# Patient Record
Sex: MALE | Race: WHITE | NOT HISPANIC OR LATINO | Employment: UNEMPLOYED | URBAN - METROPOLITAN AREA
[De-identification: names, ages, dates, MRNs, and addresses within clinical notes are randomized per-mention and may not be internally consistent; named-entity substitution may affect disease eponyms.]

---

## 2020-07-01 ENCOUNTER — HOSPITAL ENCOUNTER (EMERGENCY)
Facility: HOSPITAL | Age: 35
Discharge: HOME | End: 2020-07-01
Attending: EMERGENCY MEDICINE

## 2020-07-01 VITALS
HEART RATE: 84 BPM | RESPIRATION RATE: 16 BRPM | SYSTOLIC BLOOD PRESSURE: 159 MMHG | WEIGHT: 180 LBS | OXYGEN SATURATION: 97 % | TEMPERATURE: 98 F | DIASTOLIC BLOOD PRESSURE: 97 MMHG | BODY MASS INDEX: 24.38 KG/M2 | HEIGHT: 72 IN

## 2020-07-01 DIAGNOSIS — F10.20 ALCOHOLISM: Primary | ICD-10-CM

## 2020-07-01 DIAGNOSIS — R79.89 ELEVATED LFTS: ICD-10-CM

## 2020-07-01 DIAGNOSIS — D69.6 THROMBOCYTOPENIA: ICD-10-CM

## 2020-07-01 LAB
ALBUMIN SERPL BCP-MCNC: 4.1 G/DL (ref 3.5–5.2)
ALP SERPL-CCNC: 167 U/L (ref 55–135)
ALT SERPL W/O P-5'-P-CCNC: 232 U/L (ref 10–44)
ANION GAP SERPL CALC-SCNC: 13 MMOL/L (ref 8–16)
AST SERPL-CCNC: 573 U/L (ref 10–40)
BASOPHILS # BLD AUTO: 0.05 K/UL (ref 0–0.2)
BASOPHILS NFR BLD: 1.2 % (ref 0–1.9)
BILIRUB SERPL-MCNC: 1.3 MG/DL (ref 0.1–1)
BUN SERPL-MCNC: 6 MG/DL (ref 6–20)
CALCIUM SERPL-MCNC: 9.2 MG/DL (ref 8.7–10.5)
CHLORIDE SERPL-SCNC: 104 MMOL/L (ref 95–110)
CO2 SERPL-SCNC: 25 MMOL/L (ref 23–29)
CREAT SERPL-MCNC: 0.8 MG/DL (ref 0.5–1.4)
DIFFERENTIAL METHOD: ABNORMAL
EOSINOPHIL # BLD AUTO: 0 K/UL (ref 0–0.5)
EOSINOPHIL NFR BLD: 0.2 % (ref 0–8)
ERYTHROCYTE [DISTWIDTH] IN BLOOD BY AUTOMATED COUNT: 12.8 % (ref 11.5–14.5)
EST. GFR  (AFRICAN AMERICAN): >60 ML/MIN/1.73 M^2
EST. GFR  (NON AFRICAN AMERICAN): >60 ML/MIN/1.73 M^2
GLUCOSE SERPL-MCNC: 156 MG/DL (ref 70–110)
HCT VFR BLD AUTO: 45 % (ref 40–54)
HGB BLD-MCNC: 14.5 G/DL (ref 14–18)
IMM GRANULOCYTES # BLD AUTO: 0.01 K/UL (ref 0–0.04)
IMM GRANULOCYTES NFR BLD AUTO: 0.2 % (ref 0–0.5)
LIPASE SERPL-CCNC: 58 U/L (ref 4–60)
LYMPHOCYTES # BLD AUTO: 0.6 K/UL (ref 1–4.8)
LYMPHOCYTES NFR BLD: 15.8 % (ref 18–48)
MCH RBC QN AUTO: 32.3 PG (ref 27–31)
MCHC RBC AUTO-ENTMCNC: 32.2 G/DL (ref 32–36)
MCV RBC AUTO: 100 FL (ref 82–98)
MONOCYTES # BLD AUTO: 0.4 K/UL (ref 0.3–1)
MONOCYTES NFR BLD: 10.4 % (ref 4–15)
NEUTROPHILS # BLD AUTO: 2.9 K/UL (ref 1.8–7.7)
NEUTROPHILS NFR BLD: 72.2 % (ref 38–73)
NRBC BLD-RTO: 0 /100 WBC
PLATELET # BLD AUTO: 94 K/UL (ref 150–350)
PMV BLD AUTO: 11.3 FL (ref 9.2–12.9)
POTASSIUM SERPL-SCNC: 3.6 MMOL/L (ref 3.5–5.1)
PROT SERPL-MCNC: 9 G/DL (ref 6–8.4)
RBC # BLD AUTO: 4.49 M/UL (ref 4.6–6.2)
SODIUM SERPL-SCNC: 142 MMOL/L (ref 136–145)
WBC # BLD AUTO: 4.05 K/UL (ref 3.9–12.7)

## 2020-07-01 PROCEDURE — 85025 COMPLETE CBC W/AUTO DIFF WBC: CPT

## 2020-07-01 PROCEDURE — 99283 EMERGENCY DEPT VISIT LOW MDM: CPT

## 2020-07-01 PROCEDURE — 99285 EMERGENCY DEPT VISIT HI MDM: CPT | Mod: ,,, | Performed by: EMERGENCY MEDICINE

## 2020-07-01 PROCEDURE — 80053 COMPREHEN METABOLIC PANEL: CPT

## 2020-07-01 PROCEDURE — 99285 PR EMERGENCY DEPT VISIT,LEVEL V: ICD-10-PCS | Mod: ,,, | Performed by: EMERGENCY MEDICINE

## 2020-07-01 PROCEDURE — 83690 ASSAY OF LIPASE: CPT

## 2020-07-01 RX ORDER — CHLORDIAZEPOXIDE HYDROCHLORIDE 25 MG/1
CAPSULE, GELATIN COATED ORAL
Qty: 15 CAPSULE | Refills: 0 | Status: SHIPPED | OUTPATIENT
Start: 2020-07-01

## 2020-07-01 NOTE — ED NOTES
"Patient identifiers verified and correct for Mr Fletcher  C/C: Abnormal labs SEE NN  APPEARANCE: awake and alert in NAD.  SKIN: warm, dry and intact. No breakdown or bruising.  MUSCULOSKELETAL: Patient moving all extremities spontaneously, no obvious swelling or deformities noted. Ambulates independently.  RESPIRATORY: Denies shortness of breath.Respirations unlabored.   CARDIAC: Denies CP, 2+ distal pulses; no peripheral edema  ABDOMEN: Abdomen soft, denies nausea or vomiting  : voids spontaneously, denies difficulty, states urine dark and "frothy"   Neurologic: AAO x 4; follows commands equal strength in all extremities; denies numbness/tingling. Denies dizziness Denies weakness, states intermittent numbness to marisol feet,     "

## 2020-07-01 NOTE — ED TRIAGE NOTES
Patient states he had labs drawn Monday for possible clinical trial participation, was called today to come to ED for abnormal labs. Last ETOH today.

## 2020-07-01 NOTE — DISCHARGE INSTRUCTIONS
Stop drinking alcohol. Follow up with AA ASAP. Take librium as prescribed to help with detox process.

## 2020-07-01 NOTE — ED PROVIDER NOTES
Encounter Date: 7/1/2020    SCRIBE #1 NOTE: I, Farrahalberto Loomis, am scribing for, and in the presence of,  WiddoJosue dela cruz. I have scribed the following portions of the note - Other sections scribed: HPI, ROS, PE.       History     Chief Complaint   Patient presents with    Abnormal Lab     , , BUN 7, Cr 0.65, from clinical trial MD     The history is provided by the patient and medical records. No  was used.      Mr. Fletcher is a 35 y.o. male with hypertension, psoriasis and a history of alcoholism here today after receiving abnormal lab results. The patient states that he had blood drawn when enrolling in a clinical trial for psoriasis and was referred to the ED based on abnormal lab results. The results showed , , BUN 7, creatinine 0.65. He received similar lab results in January. The patient reports he drinks every day, about 1 pint/day of gin. His last drink was today. He reports numbness/tingling in his feet for the past approximately 2 months. He denies fevers, chills, n/v, abdominal pain, chest pain, SOB, nausea, and vomiting.         Review of patient's allergies indicates:  No Known Allergies  Past Medical History:   Diagnosis Date    Alcoholism in member of household     Hypertension      History reviewed. No pertinent surgical history.  History reviewed. No pertinent family history.  Social History     Tobacco Use    Smoking status: Never Smoker    Smokeless tobacco: Never Used   Substance Use Topics    Alcohol use: Yes     Comment: daily ETOH, today 1 alcoholic drink    Drug use: Yes     Types: Marijuana     Comment: occas     Review of Systems   Constitutional: Negative for fever.   HENT: Negative for sore throat.    Respiratory: Negative for shortness of breath.    Cardiovascular: Negative for chest pain.   Gastrointestinal: Negative for abdominal pain, nausea and vomiting.   Genitourinary: Negative for dysuria.   Musculoskeletal: Negative for  back pain.   Skin: Negative for rash.   Neurological: Positive for numbness (Numbness/tingling in feet). Negative for weakness.   Hematological: Does not bruise/bleed easily.       Physical Exam     Initial Vitals [07/01/20 1528]   BP Pulse Resp Temp SpO2   (!) 152/102 102 14 97.9 °F (36.6 °C) 97 %      MAP       --         Physical Exam    Nursing note and vitals reviewed.  Constitutional: He appears well-developed and well-nourished. He is not diaphoretic. No distress.   HENT:   Head: Normocephalic and atraumatic.   Right Ear: External ear normal.   Left Ear: External ear normal.   Eyes: No scleral icterus.   Neck: Neck supple.   Cardiovascular: Normal rate, regular rhythm, normal heart sounds and intact distal pulses.   Pulmonary/Chest: Breath sounds normal. No respiratory distress. He has no wheezes. He has no rhonchi. He has no rales.   Abdominal: Soft. He exhibits no distension. There is no abdominal tenderness. There is no rebound and no guarding.   Neurological: He is alert and oriented to person, place, and time. He has normal strength. No sensory deficit. GCS score is 15. GCS eye subscore is 4. GCS verbal subscore is 5. GCS motor subscore is 6.   Skin: Skin is warm. Capillary refill takes less than 2 seconds. No rash noted.   Multiple tattoos present.  Psoriatic plaques on legs and arms.   Psychiatric: He has a normal mood and affect.         ED Course   Procedures  Labs Reviewed   COMPREHENSIVE METABOLIC PANEL - Abnormal; Notable for the following components:       Result Value    Glucose 156 (*)     Total Protein 9.0 (*)     Total Bilirubin 1.3 (*)     Alkaline Phosphatase 167 (*)      (*)      (*)     All other components within normal limits   CBC W/ AUTO DIFFERENTIAL - Abnormal; Notable for the following components:    RBC 4.49 (*)     Mean Corpuscular Volume 100 (*)     Mean Corpuscular Hemoglobin 32.3 (*)     Platelets 94 (*)     Lymph # 0.6 (*)     Lymph% 15.8 (*)     All other  components within normal limits   LIPASE          Imaging Results    None          Medical Decision Making:   History:   Old Medical Records: I decided to obtain old medical records.  Old Records Summarized: records from another hospital.       <> Summary of Records: Seen at [facility] in January and before for EtOH withdrawals. Had elevated LFTs at that time, similar to elevations here. Had negative serologies for HIV and Hepatitis then. Was advised to f/u w/ GI but has not done so.  Clinical Tests:   Lab Tests: Ordered and Reviewed  ED Management:  Vitals normal. Afebrile.  Here today with abnormal liver function test on pre trial screening labs.  Outside hospital records obtained showing the patient has had elevated LFTs in the past.  I suspect this is likely due to chronic alcohol abuse.  He drinks a pt daily at least.  Had a daiquiri earlier today.  No signs of intoxication or withdrawal at this time.  Patient is eager to consuming alcohol as he is aware that is harming his liver.  Discussed with social work to talk with patient and provided resources for detox and AA.  Will repeat labs here to ensure they are not worsening.  CBC, CMP obtained.    Labs with mild thrombocytopenia, elevated LFTs.  T bili is 1.3 but this is not significantly elevated compared to prior.  I do not believe this requires emergent workup or admission at this time.    Discussed with patient the need for alcohol cessation to ensure the best liver recovery as possible.  Also referred to hepatology Clinic and strongly advised patient to the follow-up with such.  He reports that he wants help and is going to follow up as directed.    Will provide prescription for Librium taper this patient is actively wanting to stop drinking.  Advised to f/u w/ PCP as well as tingling may be due to B12 deficiency given alcoholism.    Stable for discharge at this time. Return precautions discussed.   Other:   I have discussed this case with another health care  provider.       <> Summary of the Discussion: ED .            Scribe Attestation:   Scribe #1: I performed the above scribed service and the documentation accurately describes the services I performed. I attest to the accuracy of the note.                          Clinical Impression:       ICD-10-CM ICD-9-CM   1. Alcoholism  F10.20 303.90   2. Elevated LFTs  R94.5 790.6   3. Thrombocytopenia  D69.6 287.5             ED Disposition Condition    Discharge Stable        ED Prescriptions     Medication Sig Dispense Start Date End Date Auth. Provider    chlordiazepoxide (LIBRIUM) 25 MG Cap Day 1:  Take 50 mg (2 tabs) PO q6hr  Day 2:  Take 25 mg (1 tab) PO q6hr  Day 3:  Take 25 mg (1 tab) PO q12hr  Day 4:  Take 25 mg (1 tab) PO qHS 15 capsule 7/1/2020  Josue Murguia MD        Follow-up Information     Follow up With Specialties Details Why Contact Info Additional Information    [facility] Emergency Medicine  If symptoms worsen [address] [id number] [phone]     [facility] - Hepatology Hepatology In 1 week  [address] [id number] [phone] 1st Floor - [facility], located by [facility]    Primary care physician  In 1 week                                        Josue Murguia MD  07/02/20 2234

## 2020-07-02 ENCOUNTER — DOCUMENTATION ONLY (OUTPATIENT)
Dept: TRANSPLANT | Facility: CLINIC | Age: 35
End: 2020-07-02

## 2020-07-02 ENCOUNTER — TELEPHONE (OUTPATIENT)
Dept: HEPATOLOGY | Facility: CLINIC | Age: 35
End: 2020-07-02

## 2020-07-02 NOTE — NURSING
Pt records reviewed.   Pt will be referred to Hepatology.  Elevated LFTs  Initial referral received  from the [facility]que.   Referring Provider/diagnosis  Josue Murguia MD      Referral letter sent to patient.

## 2020-07-02 NOTE — LETTER
July 2, 2020    Casey Fletcher  [address] [address]      Dear Casey Fletcher:    Your doctor has referred you to the [facility]. We are sending this letter to remind you to make an appointment with us to complete the referral process.     Please call us at [phone] to schedule an appointment. We look forward to seeing you soon.     If you received a call and have been scheduled, please disregard this letter.       Sincerely,        [facility]   [address] [place] [id number]  [phone]

## 2020-07-02 NOTE — TELEPHONE ENCOUNTER
----- Message from Kimberli Faulkner LPN sent at 7/2/2020  2:39 PM CDT -----    Patient numbers are high and needs an urgent appointment.      Pt will be referred to Hepatology.  Elevated LFTs  Initial referral received  from the [facility].   Referring Provider/diagnosis  Josue Murguia MD

## 2020-07-08 ENCOUNTER — TELEPHONE (OUTPATIENT)
Dept: HEPATOLOGY | Facility: CLINIC | Age: 35
End: 2020-07-08

## 2020-07-08 NOTE — TELEPHONE ENCOUNTER
----- Message from Jenifer Byrne MA sent at 7/6/2020  8:44 AM CDT -----  Call patient to offer 7/7 appt when you get to your desk   ----- Message -----  From: Kimberli Faulkner LPN  Sent: 7/2/2020   2:39 PM CDT  To: Jenifer Byrne MA      Patient numbers are high and needs an urgent appointment.      Pt will be referred to Hepatology.  Elevated LFTs  Initial referral received  from the [facility]que.   Referring Provider/diagnosis  Josue Murguia MD

## 2020-07-08 NOTE — TELEPHONE ENCOUNTER
Called patient schedule his appt to see the liver specialist he accepted 7/15 to see Dr. Siddiqui Mailed appt reminder to patient.

## 2020-07-15 ENCOUNTER — TELEPHONE (OUTPATIENT)
Dept: HEPATOLOGY | Facility: CLINIC | Age: 35
End: 2020-07-15

## 2020-07-15 ENCOUNTER — LAB VISIT (OUTPATIENT)
Dept: LAB | Facility: HOSPITAL | Age: 35
End: 2020-07-15
Attending: INTERNAL MEDICINE

## 2020-07-15 DIAGNOSIS — R74.8 ELEVATED LIVER ENZYMES: Primary | ICD-10-CM

## 2020-07-15 DIAGNOSIS — R74.8 ELEVATED LIVER ENZYMES: ICD-10-CM

## 2020-07-15 LAB
ALBUMIN SERPL BCP-MCNC: 3.8 G/DL (ref 3.5–5.2)
ALP SERPL-CCNC: 143 U/L (ref 55–135)
ALT SERPL W/O P-5'-P-CCNC: 178 U/L (ref 10–44)
ANION GAP SERPL CALC-SCNC: 15 MMOL/L (ref 8–16)
AST SERPL-CCNC: 376 U/L (ref 10–40)
BASOPHILS # BLD AUTO: 0.06 K/UL (ref 0–0.2)
BASOPHILS NFR BLD: 1.3 % (ref 0–1.9)
BILIRUB SERPL-MCNC: 1.3 MG/DL (ref 0.1–1)
BUN SERPL-MCNC: 6 MG/DL (ref 6–20)
CALCIUM SERPL-MCNC: 9.2 MG/DL (ref 8.7–10.5)
CHLORIDE SERPL-SCNC: 104 MMOL/L (ref 95–110)
CO2 SERPL-SCNC: 23 MMOL/L (ref 23–29)
CREAT SERPL-MCNC: 0.7 MG/DL (ref 0.5–1.4)
DIFFERENTIAL METHOD: ABNORMAL
EOSINOPHIL # BLD AUTO: 0.1 K/UL (ref 0–0.5)
EOSINOPHIL NFR BLD: 1.5 % (ref 0–8)
ERYTHROCYTE [DISTWIDTH] IN BLOOD BY AUTOMATED COUNT: 12.9 % (ref 11.5–14.5)
EST. GFR  (AFRICAN AMERICAN): >60 ML/MIN/1.73 M^2
EST. GFR  (NON AFRICAN AMERICAN): >60 ML/MIN/1.73 M^2
GLUCOSE SERPL-MCNC: 109 MG/DL (ref 70–110)
HCT VFR BLD AUTO: 40.5 % (ref 40–54)
HGB BLD-MCNC: 13.7 G/DL (ref 14–18)
IMM GRANULOCYTES # BLD AUTO: 0.01 K/UL (ref 0–0.04)
IMM GRANULOCYTES NFR BLD AUTO: 0.2 % (ref 0–0.5)
INR PPP: 1.1 (ref 0.8–1.2)
LYMPHOCYTES # BLD AUTO: 0.9 K/UL (ref 1–4.8)
LYMPHOCYTES NFR BLD: 19.5 % (ref 18–48)
MCH RBC QN AUTO: 33 PG (ref 27–31)
MCHC RBC AUTO-ENTMCNC: 33.8 G/DL (ref 32–36)
MCV RBC AUTO: 98 FL (ref 82–98)
MONOCYTES # BLD AUTO: 0.5 K/UL (ref 0.3–1)
MONOCYTES NFR BLD: 10.5 % (ref 4–15)
NEUTROPHILS # BLD AUTO: 3.1 K/UL (ref 1.8–7.7)
NEUTROPHILS NFR BLD: 67 % (ref 38–73)
NRBC BLD-RTO: 0 /100 WBC
PLATELET # BLD AUTO: 109 K/UL (ref 150–350)
PMV BLD AUTO: 11.6 FL (ref 9.2–12.9)
POTASSIUM SERPL-SCNC: 3.5 MMOL/L (ref 3.5–5.1)
PROT SERPL-MCNC: 8.1 G/DL (ref 6–8.4)
PROTHROMBIN TIME: 11 SEC (ref 9–12.5)
RBC # BLD AUTO: 4.15 M/UL (ref 4.6–6.2)
SODIUM SERPL-SCNC: 142 MMOL/L (ref 136–145)
WBC # BLD AUTO: 4.56 K/UL (ref 3.9–12.7)

## 2020-07-15 PROCEDURE — 87340 HEPATITIS B SURFACE AG IA: CPT

## 2020-07-15 PROCEDURE — 86705 HEP B CORE ANTIBODY IGM: CPT

## 2020-07-15 PROCEDURE — 36415 COLL VENOUS BLD VENIPUNCTURE: CPT

## 2020-07-15 PROCEDURE — 86803 HEPATITIS C AB TEST: CPT

## 2020-07-15 PROCEDURE — 85610 PROTHROMBIN TIME: CPT

## 2020-07-15 PROCEDURE — 80053 COMPREHEN METABOLIC PANEL: CPT

## 2020-07-15 PROCEDURE — 86038 ANTINUCLEAR ANTIBODIES: CPT

## 2020-07-15 PROCEDURE — 86709 HEPATITIS A IGM ANTIBODY: CPT

## 2020-07-15 PROCEDURE — 85025 COMPLETE CBC W/AUTO DIFF WBC: CPT

## 2020-07-15 NOTE — TELEPHONE ENCOUNTER
Called patient to inform him that MD is unable to see him today, we have to reschedule his appt. MD have ordered additional blood work for him schedule it for today at 3pm.    Offer video visit. He will sign up on [facility] and convert his visit on Friday 7/17.

## 2020-07-16 ENCOUNTER — TELEPHONE (OUTPATIENT)
Dept: HEPATOLOGY | Facility: CLINIC | Age: 35
End: 2020-07-16

## 2020-07-16 DIAGNOSIS — Z29.9 PROPHYLACTIC MEASURE: Primary | ICD-10-CM

## 2020-07-16 LAB
ANA SER QL IF: NORMAL
HAV IGM SERPL QL IA: NEGATIVE
HBV CORE IGM SERPL QL IA: NEGATIVE
HBV SURFACE AG SERPL QL IA: NEGATIVE
HCV AB SERPL QL IA: NEGATIVE

## 2020-07-16 NOTE — TELEPHONE ENCOUNTER
----- Message from Zina Siddiqui MD sent at 7/16/2020  5:26 AM CDT -----  Please inform patient:  Liver enzymes have improved some.  Continue to abstain from alcohol.

## 2020-07-16 NOTE — TELEPHONE ENCOUNTER
----- Message from Zina Siddiqui MD sent at 7/16/2020  1:45 PM CDT -----  Give hep A and B vaccine.  Twinrix ordered.

## 2020-07-17 ENCOUNTER — TELEPHONE (OUTPATIENT)
Dept: HEPATOLOGY | Facility: CLINIC | Age: 35
End: 2020-07-17

## 2020-07-17 PROBLEM — K70.9 ALCOHOLIC LIVER DISEASE: Status: ACTIVE | Noted: 2020-07-17

## 2020-07-17 NOTE — TELEPHONE ENCOUNTER
----- Message from Zina Siddiqui MD sent at 7/16/2020  5:36 PM CDT -----  Please inform patient results are OK.

## 2020-07-20 ENCOUNTER — TELEPHONE (OUTPATIENT)
Dept: HEPATOLOGY | Facility: CLINIC | Age: 35
End: 2020-07-20

## 2020-07-20 NOTE — TELEPHONE ENCOUNTER
----- Message from Jenifer Byrne MA sent at 7/17/2020  4:39 PM CDT -----    ----- Message -----  From: Sabino Kothari  Sent: 7/17/2020   4:31 PM CDT  To: Artur Izaguirre Staff    Pt call to reschedule missed virtual visit. Asked can he be notified of this one because he wasn't aware of the first one      [phone]